# Patient Record
Sex: MALE | Race: BLACK OR AFRICAN AMERICAN | Employment: FULL TIME | URBAN - METROPOLITAN AREA
[De-identification: names, ages, dates, MRNs, and addresses within clinical notes are randomized per-mention and may not be internally consistent; named-entity substitution may affect disease eponyms.]

---

## 2018-01-22 ENCOUNTER — APPOINTMENT (OUTPATIENT)
Dept: MRI IMAGING | Age: 23
End: 2018-01-22
Attending: EMERGENCY MEDICINE
Payer: SELF-PAY

## 2018-01-22 ENCOUNTER — HOSPITAL ENCOUNTER (EMERGENCY)
Age: 23
Discharge: HOME | End: 2018-01-22
Attending: EMERGENCY MEDICINE
Payer: SELF-PAY

## 2018-01-22 ENCOUNTER — APPOINTMENT (OUTPATIENT)
Dept: GENERAL RADIOLOGY | Age: 23
End: 2018-01-22
Attending: EMERGENCY MEDICINE
Payer: SELF-PAY

## 2018-01-22 VITALS
BODY MASS INDEX: 41.75 KG/M2 | SYSTOLIC BLOOD PRESSURE: 131 MMHG | TEMPERATURE: 98.6 F | DIASTOLIC BLOOD PRESSURE: 85 MMHG | WEIGHT: 315 LBS | HEART RATE: 76 BPM | RESPIRATION RATE: 16 BRPM | OXYGEN SATURATION: 100 % | HEIGHT: 73 IN

## 2018-01-22 DIAGNOSIS — R20.0 NUMBNESS: Primary | ICD-10-CM

## 2018-01-22 LAB
ALBUMIN SERPL-MCNC: 3.6 G/DL (ref 3.5–5)
ALBUMIN/GLOB SERPL: 1 {RATIO} (ref 1.1–2.2)
ALP SERPL-CCNC: 53 U/L (ref 45–117)
ALT SERPL-CCNC: 36 U/L (ref 12–78)
ANION GAP SERPL CALC-SCNC: 7 MMOL/L (ref 5–15)
AST SERPL-CCNC: 20 U/L (ref 15–37)
BASOPHILS # BLD: 0 K/UL (ref 0–0.1)
BASOPHILS NFR BLD: 0 % (ref 0–1)
BILIRUB SERPL-MCNC: 0.5 MG/DL (ref 0.2–1)
BUN SERPL-MCNC: 9 MG/DL (ref 6–20)
BUN/CREAT SERPL: 9 (ref 12–20)
CALCIUM SERPL-MCNC: 9.1 MG/DL (ref 8.5–10.1)
CHLORIDE SERPL-SCNC: 107 MMOL/L (ref 97–108)
CO2 SERPL-SCNC: 29 MMOL/L (ref 21–32)
CREAT SERPL-MCNC: 0.97 MG/DL (ref 0.7–1.3)
EOSINOPHIL # BLD: 0.1 K/UL (ref 0–0.4)
EOSINOPHIL NFR BLD: 1 % (ref 0–7)
ERYTHROCYTE [DISTWIDTH] IN BLOOD BY AUTOMATED COUNT: 13.6 % (ref 11.5–14.5)
ERYTHROCYTE [SEDIMENTATION RATE] IN BLOOD: 2 MM/HR (ref 0–15)
GLOBULIN SER CALC-MCNC: 3.7 G/DL (ref 2–4)
GLUCOSE SERPL-MCNC: 108 MG/DL (ref 65–100)
HCT VFR BLD AUTO: 47.7 % (ref 36.6–50.3)
HGB BLD-MCNC: 15.4 G/DL (ref 12.1–17)
LYMPHOCYTES # BLD: 1.7 K/UL (ref 0.8–3.5)
LYMPHOCYTES NFR BLD: 28 % (ref 12–49)
MCH RBC QN AUTO: 26.9 PG (ref 26–34)
MCHC RBC AUTO-ENTMCNC: 32.3 G/DL (ref 30–36.5)
MCV RBC AUTO: 83.2 FL (ref 80–99)
MONOCYTES # BLD: 0.3 K/UL (ref 0–1)
MONOCYTES NFR BLD: 5 % (ref 5–13)
NEUTS SEG # BLD: 4 K/UL (ref 1.8–8)
NEUTS SEG NFR BLD: 66 % (ref 32–75)
PLATELET # BLD AUTO: 202 K/UL (ref 150–400)
POTASSIUM SERPL-SCNC: 3.6 MMOL/L (ref 3.5–5.1)
PROT SERPL-MCNC: 7.3 G/DL (ref 6.4–8.2)
RBC # BLD AUTO: 5.73 M/UL (ref 4.1–5.7)
SODIUM SERPL-SCNC: 143 MMOL/L (ref 136–145)
WBC # BLD AUTO: 6 K/UL (ref 4.1–11.1)

## 2018-01-22 PROCEDURE — 80053 COMPREHEN METABOLIC PANEL: CPT | Performed by: EMERGENCY MEDICINE

## 2018-01-22 PROCEDURE — 36415 COLL VENOUS BLD VENIPUNCTURE: CPT | Performed by: EMERGENCY MEDICINE

## 2018-01-22 PROCEDURE — 74011250637 HC RX REV CODE- 250/637: Performed by: EMERGENCY MEDICINE

## 2018-01-22 PROCEDURE — A9576 INJ PROHANCE MULTIPACK: HCPCS | Performed by: EMERGENCY MEDICINE

## 2018-01-22 PROCEDURE — 99283 EMERGENCY DEPT VISIT LOW MDM: CPT

## 2018-01-22 PROCEDURE — 70553 MRI BRAIN STEM W/O & W/DYE: CPT

## 2018-01-22 PROCEDURE — 96360 HYDRATION IV INFUSION INIT: CPT

## 2018-01-22 PROCEDURE — 85025 COMPLETE CBC W/AUTO DIFF WBC: CPT | Performed by: EMERGENCY MEDICINE

## 2018-01-22 PROCEDURE — 85652 RBC SED RATE AUTOMATED: CPT | Performed by: EMERGENCY MEDICINE

## 2018-01-22 PROCEDURE — 74011250636 HC RX REV CODE- 250/636: Performed by: EMERGENCY MEDICINE

## 2018-01-22 PROCEDURE — 71046 X-RAY EXAM CHEST 2 VIEWS: CPT

## 2018-01-22 RX ORDER — ACETAMINOPHEN 325 MG/1
975 TABLET ORAL
Status: COMPLETED | OUTPATIENT
Start: 2018-01-22 | End: 2018-01-22

## 2018-01-22 RX ADMIN — SODIUM CHLORIDE 1000 ML: 900 INJECTION, SOLUTION INTRAVENOUS at 14:56

## 2018-01-22 RX ADMIN — GADOTERIDOL 20 ML: 279.3 INJECTION, SOLUTION INTRAVENOUS at 17:20

## 2018-01-22 RX ADMIN — ACETAMINOPHEN 975 MG: 325 TABLET, FILM COATED ORAL at 15:43

## 2018-01-22 NOTE — ED TRIAGE NOTES
Pt states that he has been having tingling and numbness to his left side, headaches in the back of his head off and on for a year, difficulty concentrating and talking for the past week.

## 2018-01-22 NOTE — LETTER
[address] 
[facility] [address] 
[phone] Work/School Note Date: 1/22/2018 To Whom It May concern: Andree Selby was seen and treated today in the emergency room by the following provider(s): 
Attending Provider: Danita Villatoro MD.   
 
 
 
Sincerely, 
 
 
 
 
Rebecca Sims RN

## 2018-01-22 NOTE — DISCHARGE INSTRUCTIONS
Numbness and Tingling: Care Instructions  Your Care Instructions    Many things can cause numbness or tingling. Swelling may put pressure on a nerve. This could cause you to lose feeling or have a pins-and-needles sensation on part of your body. Nerves may be damaged from trauma, toxins, or diseases, such as diabetes or multiple sclerosis (MS). Sometimes, though, the cause is not clear. If there is no clear reason for your symptoms, and you are not having any other symptoms, your doctor may suggest watching and waiting for a while to see if the numbness or tingling goes away on its own. Your doctor may want you to have blood or nerve tests to find the cause of your symptoms. Follow-up care is a key part of your treatment and safety. Be sure to make and go to all appointments, and call your doctor if you are having problems. It's also a good idea to know your test results and keep a list of the medicines you take. How can you care for yourself at home? · If your doctor prescribes medicine, take it exactly as directed. Call your doctor if you think you are having a problem with your medicine. · If you have any swelling, put ice or a cold pack on the area for 10 to 20 minutes at a time. Put a thin cloth between the ice and your skin. When should you call for help? Call 911 anytime you think you may need emergency care. For example, call if:  ? · You have weakness, numbness, or tingling in both legs. ? · You lose bowel or bladder control. ? · You have symptoms of a stroke. These may include:  ¨ Sudden numbness, tingling, weakness, or loss of movement in your face, arm, or leg, especially on only one side of your body. ¨ Sudden vision changes. ¨ Sudden trouble speaking. ¨ Sudden confusion or trouble understanding simple statements. ¨ Sudden problems with walking or balance. ¨ A sudden, severe headache that is different from past headaches. ? Watch closely for changes in your health, and be sure to contact your doctor if you have any problems, or if:  ? · You do not get better as expected. Where can you learn more? Go to http://noe-meggan.info/. Enter P607 in the search box to learn more about \"Numbness and Tingling: Care Instructions. \"  Current as of: October 14, 2016  Content Version: 11.4  © 3628-2950 [employer]. Care instructions adapted under license by [employer] (which disclaims liability or warranty for this information). If you have questions about a medical condition or this instruction, always ask your healthcare professional. [place] [address] any warranty or liability for your use of this information.

## 2018-01-25 ENCOUNTER — OFFICE VISIT (OUTPATIENT)
Dept: NEUROLOGY | Age: 23
End: 2018-01-25

## 2018-01-25 VITALS
DIASTOLIC BLOOD PRESSURE: 84 MMHG | HEART RATE: 104 BPM | RESPIRATION RATE: 18 BRPM | WEIGHT: 315 LBS | BODY MASS INDEX: 42.88 KG/M2 | OXYGEN SATURATION: 98 % | SYSTOLIC BLOOD PRESSURE: 150 MMHG

## 2018-01-25 DIAGNOSIS — G43.009 MIGRAINE WITHOUT AURA AND WITHOUT STATUS MIGRAINOSUS, NOT INTRACTABLE: Primary | ICD-10-CM

## 2018-01-25 DIAGNOSIS — G81.92: ICD-10-CM

## 2018-01-25 DIAGNOSIS — R06.83 SNORING: ICD-10-CM

## 2018-01-25 RX ORDER — IBUPROFEN 200 MG
TABLET ORAL
COMMUNITY

## 2018-01-25 RX ORDER — TOPIRAMATE 50 MG/1
50 TABLET, FILM COATED ORAL
Qty: 30 TAB | Refills: 2 | Status: SHIPPED | OUTPATIENT
Start: 2018-01-25

## 2018-01-25 RX ORDER — ACETAMINOPHEN 325 MG/1
TABLET ORAL
COMMUNITY

## 2018-01-25 NOTE — PATIENT INSTRUCTIONS

## 2018-01-25 NOTE — MR AVS SNAPSHOT
[address] 
[phone] Patient: Jay Jama MRN: [id number] :1995 Visit Information Date & Time Provider Department Dept. Phone Encounter #  
 2018  1:00 PM [facility] at [address] [id number] Follow-up Instructions Return in about 2 months (around 3/25/2018). Upcoming Health Maintenance Date Due DTaP/Tdap/Td series (1 - Tdap) 10/23/2014 Influenza Age 5 to Adult 2017 Allergies as of 2018  Review Complete On: 2018 By: Lex Trejo, DO No Known Allergies Current Immunizations  Never Reviewed No immunizations on file. Not reviewed this visit You Were Diagnosed With   
  
 Codes Comments Migraine without aura and without status migrainosus, not intractable    -  Primary ICD-10-CM: G43.009 ICD-9-CM: 346.10 Hemiplegia of left dominant side due to noncerebrovascular etiology, unspecified hemiplegia type ([facility]     ICD-10-CM: G81.92 
ICD-9-CM: 342.91 Snoring     ICD-10-CM: R06.83 
ICD-9-CM: 786.09 Vitals BP Pulse Resp Weight(growth percentile) SpO2 BMI  
 150/84 (!) 104 18 325 lb (147.4 kg) 98% 42.88 kg/m2 Smoking Status Never Smoker Vitals History BMI and BSA Data Body Mass Index Body Surface Area  
 42.88 kg/m 2 2.76 m 2 Preferred Pharmacy Pharmacy Name Phone [facility] [address] [place] [address] [facility] [phone] Your Updated Medication List  
  
   
This list is accurate as of: 18  1:25 PM.  Always use your most recent med list.  
  
  
  
  
 MOTRIN  mg tablet Generic drug:  ibuprofen Take  by mouth. topiramate 50 mg tablet Commonly known as:  TOPAMAX Take 1 Tab by mouth nightly. TYLENOL 325 mg tablet Generic drug:  acetaminophen Take  by mouth every four (4) hours as needed for Pain. TYLENOL PM PO Take  by mouth. Prescriptions Sent to Pharmacy Refills  
 topiramate (TOPAMAX) 50 mg tablet 2 Sig: Take 1 Tab by mouth nightly. Class: Normal  
 Pharmacy: [facility] [address] [place] [address] AT 1500 [facility] #: [phone] Route: Oral  
  
We Performed the Following SLEEP MEDICINE REFERRAL [QYD056 Custom] Comments:  
 Orders: 
Sleep Medicine Consult - Schedule patient for a sleep specialist consult. If appropriate, schedule patient for sleep study(s). Initiate treatment if needed. Forward correspondance to my office. Follow-up Instructions Return in about 2 months (around 3/25/2018). Referral Information Referral ID Referred By Referred To  
  
 [id number] [facility] Erinn Mitchell [facility] [address] [place] [address] Phone: [phone] Visits Status Start Date End Date 1 New Request 1/25/18 1/25/19 If your referral has a status of pending review or denied, additional information will be sent to support the outcome of this decision. Patient Instructions A Healthy Lifestyle: Care Instructions Your Care Instructions A healthy lifestyle can help you feel good, stay at a healthy weight, and have plenty of energy for both work and play. A healthy lifestyle is something you can share with your whole family. A healthy lifestyle also can lower your risk for serious health problems, such as high blood pressure, heart disease, and diabetes. You can follow a few steps listed below to improve your health and the health of your family. Follow-up care is a key part of your treatment and safety. Be sure to make and go to all appointments, and call your doctor if you are having problems.  It's also a good idea to know your test results and keep a list of the medicines you take. How can you care for yourself at home? · Do not eat too much sugar, fat, or fast foods. You can still have dessert and treats now and then. The goal is moderation. · Start small to improve your eating habits. Pay attention to portion sizes, drink less juice and soda pop, and eat more fruits and vegetables. ¨ Eat a healthy amount of food. A 3-ounce serving of meat, for example, is about the size of a deck of cards. Fill the rest of your plate with vegetables and whole grains. ¨ Limit the amount of soda and sports drinks you have every day. Drink more water when you are thirsty. ¨ Eat at least 5 servings of fruits and vegetables every day. It may seem like a lot, but it is not hard to reach this goal. A serving or helping is 1 piece of fruit, 1 cup of vegetables, or 2 cups of leafy, raw vegetables. Have an apple or some carrot sticks as an afternoon snack instead of a candy bar. Try to have fruits and/or vegetables at every meal. 
· Make exercise part of your daily routine. You may want to start with simple activities, such as walking, bicycling, or slow swimming. Try to be active 30 to 60 minutes every day. You do not need to do all 30 to 60 minutes all at once. For example, you can exercise 3 times a day for 10 or 20 minutes. Moderate exercise is safe for most people, but it is always a good idea to talk to your doctor before starting an exercise program. 
· Keep moving. [place]  the lawn, work in the garden, or [employer]. Take the stairs instead of the elevator at work. · If you smoke, quit. People who smoke have an increased risk for heart attack, stroke, cancer, and other lung illnesses. Quitting is hard, but there are ways to boost your chance of quitting tobacco for good. ¨ Use nicotine gum, patches, or lozenges. ¨ Ask your doctor about stop-smoking programs and medicines. ¨ Keep trying.  
In addition to reducing your risk of diseases in the future, you will notice some benefits soon after you stop using tobacco. If you have shortness of breath or asthma symptoms, they will likely get better within a few weeks after you quit. · Limit how much alcohol you drink. Moderate amounts of alcohol (up to 2 drinks a day for men, 1 drink a day for women) are okay. But drinking too much can lead to liver problems, high blood pressure, and other health problems. Family health If you have a family, there are many things you can do together to improve your health. · Eat meals together as a family as often as possible. · Eat healthy foods. This includes fruits, vegetables, lean meats and dairy, and whole grains. · Include your family in your fitness plan. Most people think of activities such as jogging or tennis as the way to fitness, but there are many ways you and your family can be more active. Anything that makes you breathe hard and gets your heart pumping is exercise. Here are some tips: 
¨ Walk to do errands or to take your child to school or the bus. ¨ Go for a family bike ride after dinner instead of watching TV. Where can you learn more? Go to http://noeSolapa4meggan.info/. Enter W103 in the search box to learn more about \"A Healthy Lifestyle: Care Instructions. \" Current as of: May 12, 2017 Content Version: 11.4 © 7680-7228 [employer]. Care instructions adapted under license by [employer] (which disclaims liability or warranty for this information). If you have questions about a medical condition or this instruction, always ask your healthcare professional. [place] [address] any warranty or liability for your use of this information. Introducing \[facility] [place] [facility]\"" & HEALTH SERVICES! [employer] introduces [employer] patient portal. Now you can access parts of your medical record, email your doctor's office, and request medication refills online.    
 
1. In your internet browser, go to [email or web address] 2. Click on the First Time User? Click Here link in the Sign In box. You will see the New Member Sign Up page. 3. Enter your AF83 Access Code exactly as it appears below. You will not need to use this code after youve completed the sign-up process. If you do not sign up before the expiration date, you must request a new code. · AF83 Access Code: [email or web address] Expires: 4/22/2018  5:54 PM 
 
4. Enter the last four digits of your Social Security Number (xxxx) and Date of Birth (mm/dd/yyyy) as indicated and click Submit. You will be taken to the next sign-up page. 5. Create a MobileWebsitest ID. This will be your AF83 login ID and cannot be changed, so think of one that is secure and easy to remember. 6. Create a AF83 password. You can change your password at any time. 7. Enter your Password Reset Question and Answer. This can be used at a later time if you forget your password. 8. Enter your e-mail address. You will receive e-mail notification when new information is available in [address] 9. Click Sign Up. You can now view and download portions of your medical record. 10. Click the Download Summary menu link to download a portable copy of your medical information. If you have questions, please visit the Frequently Asked Questions section of the AF83 website. Remember, AF83 is NOT to be used for urgent needs. For medical emergencies, dial 911. Now available from your iPhone and Android! Please provide this summary of care documentation to your next provider. Your primary care clinician is listed as NONE. If you have any questions after today's visit, please call [phone].

## 2018-01-25 NOTE — PROGRESS NOTES
Pain to back of head x1 year  Daily   Difficulty finding words while head hurts   Light/sound sensitivity   C/o Weakness to left sided extremities   Dizziness   Blurry vision to both eyes

## 2018-01-25 NOTE — PROGRESS NOTES
Dimitri Springer PATIENT EVALUATION/CONSULTATION       PATIENT NAME: Minerva Wade    MRN: [id number]    REASON FOR CONSULTATION: Headaches    01/25/18      Previous records (physician notes, laboratory reports, and radiology reports) and imaging studies were reviewed and summarized. My recommendations will be communicated back to the patient's physician(s) via electronic medical record and/or by [facility] mail. HISTORY OF PRESENT ILLNESS:  Minerva Wade is a 23 y.o. right handed male presenting for evaluation of headaches. HAs present over the past year, progressive in terms of frequency with associated neurologic sx. Location: Occipitally  Character: throbbing  Intensity: On average 7/10  Frequency: Daily x 2 weeks, prior to that twice monthly  # HA free days per month: 3  Duration: 2 minutes on average  Aura: none  Associated Sx with HA: +nausea, +phonophotophobia. Denies unilateral ptosis, conjunctival injection, lacrimation, sweating  Neurological ROS: Left sided weakness with headaches over the last 2 months, Left paresthesias, denies vision loss. +blurred vision, +dizziness, +hand tremor  Systemic ROS:   Caffeine use: None  H/O Head trauma: None  Depressive or anxiety Sx: +Anxiety, stress    Any change in pattern of HA? As above    Triggers: Noise, lights. Worse in the mornings upon standing. Alleviating factors: N/A  FHx HA/migraine: +maternal GM, mother and maternal aunt, sister with migraines    Treatment so far: Tylenol daily-effective and or motrin    Investigations so far: MRI brain normal      PAST MEDICAL HISTORY:  History reviewed. No pertinent past medical history. PAST SURGICAL HISTORY:  History reviewed. No pertinent surgical history.     FAMILY HISTORY:   Family History   Problem Relation Age of Onset    Headache Mother     Headache Sister     Headache Maternal Aunt     Cancer Maternal Grandmother     Dementia Maternal Grandmother     Stroke Maternal Grandmother     Cancer Paternal Grandfather          SOCIAL HISTORY:  Social History     Social History    Marital status: SINGLE     Spouse name: N/A    Number of children: N/A    Years of education: N/A     Social History Main Topics    Smoking status: Never Smoker    Smokeless tobacco: Never Used    Alcohol use No    Drug use: No    Sexual activity: Not Asked     Other Topics Concern    None     Social History Narrative         MEDICATIONS:   Current Outpatient Prescriptions   Medication Sig Dispense Refill    acetaminophen (TYLENOL) 325 mg tablet Take  by mouth every four (4) hours as needed for Pain.  ibuprofen (MOTRIN IB) 200 mg tablet Take  by mouth.  ACETAMINOPHEN/DIPHENHYDRAMINE (TYLENOL PM PO) Take  by mouth. ALLERGIES:  No Known Allergies      REVIEW OF SYSTEMS:  10 point ROS reviewed with patient. Please see scanned document under media. PHYSICAL EXAM:  Vital Signs:   Visit Vitals    /84    Pulse (!) 104    Resp 18    Wt 147.4 kg (325 lb)    SpO2 98%    BMI 42.88 kg/m2        General Medical Exam:  General:  Well appearing, comfortable, in no apparent distress, obese AAM.  Eyes/ENT: see cranial nerve examination. Neck: No masses appreciated. Full range of motion without tenderness. Respiratory:  Clear to auscultation, good air entry bilaterally. Cardiac:  Regular rate and rhythm, no murmur. GI:  Soft, non-tender, non-distended abdomen. Bowel sounds normal. No masses, organomegaly. Extremities:  No deformities, edema, or skin discoloration. Skin:  No rashes or lesions. Neurological:  · Mental Status:  Alert and oriented to person, place, and time with fluent speech. · Cranial Nerves:   CNII/III/IV/VI: Optic disc w/clear margins b/l, visual fields full to confrontation, EOMI, PERRL, no ptosis or nystagmus.    CN V: Facial sensation intact bilaterally, masseter 5/5   CN VII: Facial muscles symmetric and strong   CN VIII: Hears finger rub well bilaterally, intact vestibular function   CN IX/X: Normal palatal movement   CN XI: Full strength shoulder shrug bilaterally   CN XII: Tongue protrusion full and midline without fasciculation or atrophy  · Motor: Normal tone and muscle bulk with no pronator drift. No atrophy or fasciculations present on examination. Individual muscle group testing:  Shoulder abduction:   Left:5/5   Right : 5/5    Shoulder adduction:   Left:5/5   Right : 5/5    Elbow flexion:      Left:5/5   Right : 5/5  Elbow extension:    Left:5/5   Right : 5/5   Wrist flexion:    Left:5/5   Right : 5/5  Wrist extension:    Left:5/5   Right : 5/5  Arm pronation:   Left:5/5   Right : 5/5  Arm supination:   Left:5/5   Right : 5/5    Finger flexion:    Left:5/5   Right : 5/5    Finger extension:   Left:5/5   Right : 5/5   Finger abduction:  Left:5/5   Right : 5/5   Finger adduction:   Left:5/5   Right : 5/5  Hip flexion:     Left:5/5   Right : 5/5         Hip extension:   Left:5/5   Right : 5/5    Knee flexion:    Left:5/5   Right : 5/5    Knee extension:   Left:5/5   Right : 5/5    Dorsiflexion:     Left:5/5   Right : 5/5  Plantar flexion:    Left:5/5   Right : 5/5      · MSRs: No crossed adductors or clonus. RIGHT  LEFT   Brachioradialis 3+ 3+   Biceps 3+ 3+   Triceps 2+ 2+   Knee 3+ 3+   Achilles 3+ 3+        Plantar response Downward Downward          · Sensation: Decreased LT LUE/LLE, otherwise normal and symmetric perception of pinprick, temperature, proprioception, and vibration; (-) Romberg. · Coordination: No dysmetria. Normal rapid alternating movements; finger-to-nose and heel-to- shin testing are within normal limits. · Gait: Normal native gait    PERTINENT DATA:  INTERNAL RECORDS:  The patient's electronic medical record was reviewed. The relevant details include:    MRI Results (maximum last 3):     Results from [place] encounter on 01/22/18   MRI BRAIN W WO CONT   Narrative CLINICAL HISTORY: Left-sided numbness and dysarthria    INDICATION: numbness left sided, dysarthria. COMPARISON: None    TECHNIQUE: MR examination of the brain includes axial and sagittal T1  pre-and-post contrast, axial T2, axial FLAIR, axial gradient echo, axial DWI,  coronal T1 postcontrast.  CONTRAST: The patient was administered gadolinium-based contrast material axial  and coronal T1-weighted postcontrast enhanced imaging was obtained. FINDINGS:   There is no intracranial mass, hemorrhage or evidence of acute infarction. Cavum septum. There is no Chiari or syrinx. The pituitary and infundibulum are grossly  unremarkable. There is no skull base mass. Cerebellopontine angles are grossly  unremarkable. The major intracranial vascular flow-voids are unremarkable. There is no abnormal parenchymal enhancement. There is no abnormal meningeal  enhancement. The cavernous sinuses are symmetric. Optic chiasm and infundibulum  grossly unremarkable. Orbits are grossly symmetric. Dural venous sinuses are  grossly patent. The brain architecture is normal. There is no evidence of  midline shift or mass-effect. There are no extra-axial fluid collections. The mastoid air cells are well pneumatized and clear. Impression IMPRESSION:  Normal MRI of the brain. No intracranial mass, hemorrhage or evidence of acute infarction. ASSESSMENT:      ICD-10-CM ICD-9-CM    1. Migraine without aura and without status migrainosus, not intractable G43.009 346.10 SLEEP MEDICINE REFERRAL   2. Hemiplegia of left dominant side due to noncerebrovascular etiology, unspecified hemiplegia type (HCC) G81.92 342.91    3. Snoring R06.83 786.09 SLEEP MEDICINE REFERRAL   23year old AAM presenting with headaches with migrainous features, associated left hemiparesis, paresthesias over the past year with increasing frequency. Neurological examination reveals LUE/LLE sensory deficit.   MRI Brain recently performed 1/22/18 and normal.    He has a very strong family history of migraine. Presentation is most c/w hemiplegic migraine variant. Also suspect a component of medication overuse due to daily OTC analgesic use. Lastly, he may also have comorbid CLAUDINE which may contribute to headaches. Will place referral to sleep medicine for PSG. Headache education  Discussed pathophysiology of headache. Discussed use of headache diary. Discussed triggers and lifestyle modifications including limiting caffeine consumption. Discussed treatment options, both abortive and preventive medications. Instructed patient about medications and potential side effects. Discussed medication overuse headache and to limit use of analgesics to less than 2 doses per week. PLAN:  · Obtain imaging reports from outside ED visit  · Sleep medicine referral   · Start TPM 50mg qhs   · May use OTC analgesics sparingly for rescue HA therapy    Follow-up Disposition:  Return in about 2 months (around 3/25/2018). Gildardo Pedroza DO  Staff Neurologist  Diplomate, [address] [employer] [place] [employer] of [employer]iat[employer] & Neurology     Referring provider:  Mandy Ortiz MD

## 2018-01-26 ENCOUNTER — DOCUMENTATION ONLY (OUTPATIENT)
Dept: SLEEP MEDICINE | Age: 23
End: 2018-01-26

## 2018-01-26 ENCOUNTER — DOCUMENTATION ONLY (OUTPATIENT)
Dept: NEUROLOGY | Age: 23
End: 2018-01-26

## 2018-01-29 ENCOUNTER — DOCUMENTATION ONLY (OUTPATIENT)
Dept: SLEEP MEDICINE | Age: 23
End: 2018-01-29

## 2018-01-30 ENCOUNTER — DOCUMENTATION ONLY (OUTPATIENT)
Dept: SLEEP MEDICINE | Age: 23
End: 2018-01-30

## 2018-02-27 ENCOUNTER — HOSPITAL ENCOUNTER (EMERGENCY)
Age: 23
Discharge: HOME | End: 2018-02-28
Attending: EMERGENCY MEDICINE
Payer: SELF-PAY

## 2018-02-27 VITALS
HEART RATE: 73 BPM | SYSTOLIC BLOOD PRESSURE: 186 MMHG | RESPIRATION RATE: 16 BRPM | BODY MASS INDEX: 41.75 KG/M2 | OXYGEN SATURATION: 99 % | DIASTOLIC BLOOD PRESSURE: 101 MMHG | TEMPERATURE: 97.6 F | WEIGHT: 315 LBS | HEIGHT: 73 IN

## 2018-02-27 DIAGNOSIS — L60.0 INGROWN NAIL OF GREAT TOE OF LEFT FOOT: Primary | ICD-10-CM

## 2018-02-27 PROCEDURE — 87186 SC STD MICRODIL/AGAR DIL: CPT | Performed by: PHYSICIAN ASSISTANT

## 2018-02-27 PROCEDURE — 87205 SMEAR GRAM STAIN: CPT | Performed by: PHYSICIAN ASSISTANT

## 2018-02-27 PROCEDURE — 99283 EMERGENCY DEPT VISIT LOW MDM: CPT

## 2018-02-27 PROCEDURE — 87077 CULTURE AEROBIC IDENTIFY: CPT | Performed by: PHYSICIAN ASSISTANT

## 2018-02-27 PROCEDURE — 74011250637 HC RX REV CODE- 250/637: Performed by: PHYSICIAN ASSISTANT

## 2018-02-27 RX ORDER — SULFAMETHOXAZOLE AND TRIMETHOPRIM 800; 160 MG/1; MG/1
1 TABLET ORAL
Status: COMPLETED | OUTPATIENT
Start: 2018-02-27 | End: 2018-02-27

## 2018-02-27 RX ORDER — SULFAMETHOXAZOLE AND TRIMETHOPRIM 800; 160 MG/1; MG/1
1 TABLET ORAL 2 TIMES DAILY
Qty: 14 TAB | Refills: 0 | Status: SHIPPED | OUTPATIENT
Start: 2018-02-27

## 2018-02-27 RX ADMIN — SULFAMETHOXAZOLE AND TRIMETHOPRIM 1 TABLET: 800; 160 TABLET ORAL at 23:48

## 2018-02-28 NOTE — ED PROVIDER NOTES
HPI Comments: Kaylen Pelayo is a 23 y.o. male  who presents by private vehicle to ER with c/o Patient presents with: Toe Pain. Patient with left great toe pain with swelling, redness and drainage around the nail. Patient denies fever or chills. Denies any recent trauma. Patient has tried soaking the toe with minimal relief. Denies any significant medical history. He specifically denies any fevers, chills, nausea, vomiting, chest pain, shortness of breath, headache, rash, diarrhea, abdominal pain, urinary/bowel changes, sweating or weight loss. PCP: None   PMHx significant for: History reviewed. No pertinent past medical history. PSHx significant for: History reviewed. No pertinent surgical history. Social Hx: Tobacco use: Smoking status: Never Smoker                                                              Smokeless status: Never Used                      ; EtOH use: The patient states he drinks 0 per week.; Illicit Drug use: Allergies:  No Known Allergies    There are no other complaints, changes or physical findings at this time. Patient is a 23 y.o. male presenting with toe pain. The history is provided by the patient. Toe Pain    This is a recurrent problem. The current episode started more than 2 days ago. The problem occurs constantly. The problem has been gradually worsening. The pain is present in the left toes. The quality of the pain is described as aching. The pain is at a severity of 10/10. The pain is severe. Pertinent negatives include no numbness, full range of motion, no stiffness, no tingling, no itching, no back pain and no neck pain. The symptoms are aggravated by palpation and standing. He has tried nothing for the symptoms. There has been no history of extremity trauma. History reviewed. No pertinent past medical history. History reviewed. No pertinent surgical history.       Family History:   Problem Relation Age of Onset    Headache Mother     Headache Sister     Headache Maternal Aunt     Cancer Maternal Grandmother     Dementia Maternal Grandmother     Stroke Maternal Grandmother     Cancer Paternal Grandfather        Social History     Social History    Marital status: SINGLE     Spouse name: N/A    Number of children: N/A    Years of education: N/A     Occupational History    Not on file. Social History Main Topics    Smoking status: Never Smoker    Smokeless tobacco: Never Used    Alcohol use No    Drug use: No    Sexual activity: Not on file     Other Topics Concern    Not on file     Social History Narrative         ALLERGIES: Review of patient's allergies indicates no known allergies. Review of Systems   Constitutional: Negative. HENT: Negative. Eyes: Negative. Respiratory: Negative. Cardiovascular: Negative. Gastrointestinal: Negative. Endocrine: Negative. Genitourinary: Negative. Musculoskeletal: Negative. Negative for back pain, neck pain and stiffness. Skin: Positive for wound. Negative for itching. Allergic/Immunologic: Negative. Neurological: Negative. Negative for tingling and numbness. Hematological: Negative. Psychiatric/Behavioral: Negative. All other systems reviewed and are negative. Vitals:    02/27/18 2328   BP: (!) 186/101   Pulse: 73   Resp: 16   Temp: 97.6 °F (36.4 °C)   SpO2: 99%   Weight: 149.7 kg (330 lb)   Height: 6' 1\" (1.854 m)            Physical Exam   Constitutional: He is oriented to person, place, and time. He appears well-developed and well-nourished. HENT:   Head: Normocephalic and atraumatic. Right Ear: External ear normal.   Left Ear: External ear normal.   Mouth/Throat: Oropharynx is clear and moist. No oropharyngeal exudate. Eyes: Conjunctivae and EOM are normal. Pupils are equal, round, and reactive to light. Right eye exhibits no discharge. Left eye exhibits no discharge. No scleral icterus. Neck: Normal range of motion. Neck supple.  No tracheal deviation present. No thyromegaly present. Cardiovascular: Normal rate, regular rhythm, normal heart sounds and intact distal pulses. No murmur heard. Pulmonary/Chest: Effort normal and breath sounds normal. No respiratory distress. He has no wheezes. He has no rales. Abdominal: Soft. Bowel sounds are normal. He exhibits no distension. There is no tenderness. There is no rebound and no guarding. Musculoskeletal: Normal range of motion. He exhibits no edema or tenderness. Feet:    Lymphadenopathy:     He has no cervical adenopathy. Neurological: He is alert and oriented to person, place, and time. No cranial nerve deficit. Coordination normal.   Skin: Skin is warm. No rash noted. No erythema. Psychiatric: He has a normal mood and affect. His behavior is normal. Judgment and thought content normal.   Nursing note and vitals reviewed. MDM  Number of Diagnoses or Management Options  Ingrown nail of great toe of left foot:   Diagnosis management comments: Assesment/Plan- 23 y.o. Patient presents with: Toe Pain  differential includes: ingrown toe nail. PE findings consistent with ingrown toe nail, patient is afebrile no history of diabetes. Encouraged continued soaks, rx for bactrim. Recommend Podiatry follow up. Patient educated on reasons to return to the ED.           ED Course       Procedures

## 2018-02-28 NOTE — DISCHARGE INSTRUCTIONS
Ingrown Toenail: Care Instructions  Your Care Instructions    An ingrown toenail often occurs because a nail is not trimmed correctly or because shoes are too tight. An ingrown nail can cause an infection. If your toe is infected, your doctor may prescribe antibiotics. Most ingrown toenails can be treated at home. You should trim toenails straight across, so the ends of the nail grow over the skin and not into it. Good nail care can prevent ingrown toenails. Follow-up care is a key part of your treatment and safety. Be sure to make and go to all appointments, and call your doctor if you are having problems. It's also a good idea to know your test results and keep a list of the medicines you take. How can you care for yourself at home? · Trim the nails straight across. Leave the corners a little longer so they do not cut into the skin. To do this when you have an ingrown nail:  ¨ Soak your foot in warm water for about 15 minutes to soften the nail. ¨ Wedge a small piece of wet cotton under the corner of the nail to cushion the nail and lift it slightly. This keeps it from cutting the skin. ¨ Repeat daily until the nail has grown out and can be trimmed. · Do not use manicure scissors to dig under the ingrown nail. You might stab your toe, which could get infected. · Do not trim your toenails too short. · Check with your doctor before trimming your own toenails if you have been diagnosed with diabetes or peripheral arterial disease. These conditions increase the risk of an infection, because you may have decreased sensation in your toes and cut yourself without knowing it. · Wear roomy, comfortable shoes. · If your doctor prescribed antibiotics, take them as directed. Do not stop taking them just because you feel better. You need to take the full course of antibiotics. When should you call for help?   Call your doctor now or seek immediate medical care if:  ? · You have signs of infection, such as:  ¨ Increased pain, swelling, warmth, or redness. ¨ Red streaks leading from the toe. ¨ Pus draining from the toe. ¨ A fever. ? Watch closely for changes in your health, and be sure to contact your doctor if:  ? · You do not get better as expected. Where can you learn more? Go to http://noe-meggan.info/. Enter R135 in the search box to learn more about \"Ingrown Toenail: Care Instructions. \"  Current as of: October 13, 2016  Content Version: 11.4  © 0156-4929 [employer]. Care instructions adapted under license by [facility] (which disclaims liability or warranty for this information). If you have questions about a medical condition or this instruction, always ask your healthcare professional. [place] [address] any warranty or liability for your use of this information. We hope that we have addressed all of your medical concerns. The examination and treatment you received in the Emergency Department were for an emergent problem and were not intended as complete care. It is important that you follow up with your healthcare provider(s) for ongoing care. If your symptoms worsen or do not improve as expected, and you are unable to reach your usual health care provider(s), you should return to the Emergency Department. Today's healthcare is undergoing tremendous change, and patient satisfaction surveys are one of the many tools to assess the quality of medical care. You may receive a survey from the [employer] organization regarding your experience in the Emergency Department. I hope that your experience has been completely positive, particularly the medical care that I provided. As such, please participate in the survey; anything less than excellent does not meet my expectations or intentions. [address] [facility] and [address] [place] [address] participate in nationally recognized quality of care measures.   If your blood pressure is greater than 120/80, as reported below, we urge that you seek medical care to address the potential of high blood pressure, commonly known as hypertension. Hypertension can be hereditary or can be caused by certain medical conditions, pain, stress, or \"white coat syndrome. \"       Please make an appointment with your health care provider(s) for follow up of your Emergency Department visit. VITALS:   Patient Vitals for the past 8 hrs:   Temp Pulse Resp BP SpO2   02/27/18 2328 97.6 °F (36.4 °C) 73 16 (!) 186/101 99 %          Thank you for allowing us to provide you with medical care today. We realize that you have many choices for your emergency care needs. Please choose us in the future for any continued health care needs. Sheila Millan, [address]: [phone]            No results found for this or any previous visit (from the past 24 hour(s)). No results found.

## 2018-02-28 NOTE — ED TRIAGE NOTES
Patient has an ingrown toenail on his left great toe that has been getting more and more painful. Is having discharge as well.

## 2018-03-02 LAB
BACTERIA SPEC CULT: ABNORMAL
GRAM STN SPEC: ABNORMAL
GRAM STN SPEC: ABNORMAL
SERVICE CMNT-IMP: ABNORMAL

## 2018-03-02 NOTE — PROGRESS NOTES
tx with bactrim. Should cover both staph infections. Recommend continuing with bactrim ds. Discussed with Dr Rita Nichole. He recommends not changing rx/ adding rx unless pt not improving. Phoned pt to confirm improvement.  Left vm